# Patient Record
Sex: FEMALE | Race: BLACK OR AFRICAN AMERICAN | NOT HISPANIC OR LATINO | URBAN - METROPOLITAN AREA
[De-identification: names, ages, dates, MRNs, and addresses within clinical notes are randomized per-mention and may not be internally consistent; named-entity substitution may affect disease eponyms.]

---

## 2018-02-19 ENCOUNTER — TELEPHONE (OUTPATIENT)
Dept: TRANSPLANT | Facility: CLINIC | Age: 53
End: 2018-02-19

## 2018-02-19 NOTE — TELEPHONE ENCOUNTER
Angel Shin called and stated that she is interested in becoming a living donor for her aunt, Deepthi Jensen.  Medical and social history obtained.  No contraindications noted.  All questions answered.  HLA kit requested. Education book emailed to patient.

## 2018-02-27 ENCOUNTER — TELEPHONE (OUTPATIENT)
Dept: TRANSPLANT | Facility: CLINIC | Age: 53
End: 2018-02-27

## 2018-02-27 DIAGNOSIS — Z00.5 TRANSPLANT DONOR EVALUATION: Primary | ICD-10-CM

## 2018-03-08 ENCOUNTER — LAB VISIT (OUTPATIENT)
Dept: LAB | Facility: HOSPITAL | Age: 53
End: 2018-03-08
Attending: NURSE PRACTITIONER
Payer: MEDICARE

## 2018-03-08 DIAGNOSIS — Z00.5 TRANSPLANT DONOR EVALUATION: ICD-10-CM

## 2018-03-08 LAB — ABO + RH BLD: NORMAL

## 2018-03-08 PROCEDURE — 86901 BLOOD TYPING SEROLOGIC RH(D): CPT | Mod: TXP

## 2018-03-08 PROCEDURE — 81373 HLA I TYPING 1 LOCUS LR: CPT | Mod: PO,TXP

## 2018-03-08 PROCEDURE — 81373 HLA I TYPING 1 LOCUS LR: CPT | Mod: 91,PO,TXP

## 2018-03-08 PROCEDURE — 81376 HLA II TYPING 1 LOCUS LR: CPT | Mod: 91,PO,TXP

## 2018-03-08 PROCEDURE — 81376 HLA II TYPING 1 LOCUS LR: CPT | Mod: PO,TXP

## 2018-03-08 PROCEDURE — 36415 COLL VENOUS BLD VENIPUNCTURE: CPT | Mod: PO,TXP

## 2018-03-22 LAB
HLA DRB4 1: NORMAL
HLA SSO DNA TYPING CLASS I & II INTERPRETATION: NORMAL
HLA-A 1 SERO. EQUIV: 30
HLA-A 1: NORMAL
HLA-A 2 SERO. EQUIV: 33
HLA-A 2: NORMAL
HLA-B 1 SERO. EQUIV: 42
HLA-B 1: NORMAL
HLA-B 2 SERO. EQUIV: 45
HLA-B 2: NORMAL
HLA-BW 1 SERO. EQUIV: 6
HLA-BW 2 SERO. EQUIV: NORMAL
HLA-C 1: NORMAL
HLA-C 2: NORMAL
HLA-CW 1 SERO. EQUIV: 16
HLA-CW 2 SERO. EQUIV: 17
HLA-DQ 1 SERO. EQUIV: 4
HLA-DQ 2 SERO. EQUIV: 6
HLA-DQB1 1: NORMAL
HLA-DQB1 2: NORMAL
HLA-DRB1 1 SERO. EQUIV: 18
HLA-DRB1 1: NORMAL
HLA-DRB1 2 SERO. EQUIV: 15
HLA-DRB1 2: NORMAL
HLA-DRB3 1: NORMAL
HLA-DRB3 2: NORMAL
HLA-DRB345 1 SERO. EQUIV: 52
HLA-DRB345 2 SERO. EQUIV: 51
HLA-DRB4 2: NORMAL
HLA-DRB5 1: NORMAL
HLA-DRB5 2: NORMAL
HLATY INTERPRETATION: NORMAL
SSDQB TESTING DATE: NORMAL
SSDRB TESTING DATE: NORMAL
SSOA TESTING DATE: NORMAL
SSOB TESTING DATE: NORMAL
SSOC TESTING DATE: NORMAL
SSODR TESTING DATE: NORMAL
TYSSO TESTING DATE: NORMAL

## 2018-03-29 ENCOUNTER — TELEPHONE (OUTPATIENT)
Dept: TRANSPLANT | Facility: CLINIC | Age: 53
End: 2018-03-29

## 2018-04-04 ENCOUNTER — TELEPHONE (OUTPATIENT)
Dept: TRANSPLANT | Facility: CLINIC | Age: 53
End: 2018-04-04

## 2018-04-04 DIAGNOSIS — Z00.5 TRANSPLANT DONOR EVALUATION: Primary | ICD-10-CM

## 2018-04-04 NOTE — TELEPHONE ENCOUNTER
Called patient to inform her that she is compatible.  She would like to proceed with medical evaluation.  Necessary testing reviewed with patient.  Contact information for Julieth Dudley was provided for scheduling appointments.  Will fax over most recent Mammo and PAP.  All questions answered and patient verbalized understanding.

## 2018-04-10 DIAGNOSIS — Z00.5 TRANSPLANT DONOR EVALUATION: Primary | ICD-10-CM

## 2018-04-17 DIAGNOSIS — Z00.5 TRANSPLANT DONOR EVALUATION: Primary | ICD-10-CM

## 2018-05-07 ENCOUNTER — HOSPITAL ENCOUNTER (OUTPATIENT)
Dept: RADIOLOGY | Facility: HOSPITAL | Age: 53
Discharge: HOME | End: 2018-05-07
Attending: NURSE PRACTITIONER
Payer: MEDICARE

## 2018-05-07 ENCOUNTER — LAB VISIT (OUTPATIENT)
Dept: LAB | Facility: HOSPITAL | Age: 53
End: 2018-05-07
Payer: MEDICARE

## 2018-05-07 DIAGNOSIS — Z00.5 TRANSPLANT DONOR EVALUATION: ICD-10-CM

## 2018-05-07 LAB
ABO + RH BLD: NORMAL
ALBUMIN SERPL BCP-MCNC: 3.9 G/DL
ALP SERPL-CCNC: 130 U/L
ALT SERPL W/O P-5'-P-CCNC: 62 U/L
ANION GAP SERPL CALC-SCNC: 9 MMOL/L
APTT BLDCRRT: 21.5 SEC
AST SERPL-CCNC: 56 U/L
BASOPHILS # BLD AUTO: 0.04 K/UL
BASOPHILS NFR BLD: 0.8 %
BILIRUB SERPL-MCNC: 0.4 MG/DL
BLD GP AB SCN CELLS X3 SERPL QL: NORMAL
BUN SERPL-MCNC: 8 MG/DL
CALCIUM SERPL-MCNC: 9.2 MG/DL
CHLORIDE SERPL-SCNC: 107 MMOL/L
CHOLEST SERPL-MCNC: 215 MG/DL
CHOLEST/HDLC SERPL: 3.2 {RATIO}
CO2 SERPL-SCNC: 23 MMOL/L
CREAT SERPL-MCNC: 0.7 MG/DL
DIFFERENTIAL METHOD: ABNORMAL
EOSINOPHIL # BLD AUTO: 0.1 K/UL
EOSINOPHIL NFR BLD: 2.7 %
ERYTHROCYTE [DISTWIDTH] IN BLOOD BY AUTOMATED COUNT: 13.8 %
EST. GFR  (AFRICAN AMERICAN): >60 ML/MIN/1.73 M^2
EST. GFR  (NON AFRICAN AMERICAN): >60 ML/MIN/1.73 M^2
ESTIMATED AVG GLUCOSE: 108 MG/DL
GLUCOSE SERPL-MCNC: 107 MG/DL
GLUCOSE SERPL-MCNC: 138 MG/DL
GLUCOSE SERPL-MCNC: 86 MG/DL
GLUCOSE SERPL-MCNC: 89 MG/DL
HBA1C MFR BLD HPLC: 5.4 %
HCT VFR BLD AUTO: 29.4 %
HDLC SERPL-MCNC: 68 MG/DL
HDLC SERPL: 31.6 %
HGB BLD-MCNC: 9.2 G/DL
IMM GRANULOCYTES # BLD AUTO: 0.02 K/UL
IMM GRANULOCYTES NFR BLD AUTO: 0.4 %
INR PPP: 0.9
LDLC SERPL CALC-MCNC: 135.6 MG/DL
LYMPHOCYTES # BLD AUTO: 1.7 K/UL
LYMPHOCYTES NFR BLD: 32 %
MCH RBC QN AUTO: 23.8 PG
MCHC RBC AUTO-ENTMCNC: 31.3 G/DL
MCV RBC AUTO: 76 FL
MONOCYTES # BLD AUTO: 0.3 K/UL
MONOCYTES NFR BLD: 6.6 %
NEUTROPHILS # BLD AUTO: 3 K/UL
NEUTROPHILS NFR BLD: 57.5 %
NONHDLC SERPL-MCNC: 147 MG/DL
NRBC BLD-RTO: 0 /100 WBC
PHOSPHATE SERPL-MCNC: 3.1 MG/DL
PLATELET # BLD AUTO: 294 K/UL
PMV BLD AUTO: 10.6 FL
POTASSIUM SERPL-SCNC: 3.8 MMOL/L
PROT SERPL-MCNC: 7.6 G/DL
PROTHROMBIN TIME: 9.8 SEC
RBC # BLD AUTO: 3.86 M/UL
SODIUM SERPL-SCNC: 139 MMOL/L
TRIGL SERPL-MCNC: 57 MG/DL
WBC # BLD AUTO: 5.16 K/UL

## 2018-05-07 PROCEDURE — 86665 EPSTEIN-BARR CAPSID VCA: CPT | Mod: TXP

## 2018-05-07 PROCEDURE — 86592 SYPHILIS TEST NON-TREP QUAL: CPT | Mod: TXP

## 2018-05-07 PROCEDURE — 78707 K FLOW/FUNCT IMAGE W/O DRUG: CPT | Mod: 26,TXP,, | Performed by: RADIOLOGY

## 2018-05-07 PROCEDURE — 86704 HEP B CORE ANTIBODY TOTAL: CPT | Mod: TXP

## 2018-05-07 PROCEDURE — 83540 ASSAY OF IRON: CPT | Mod: TXP

## 2018-05-07 PROCEDURE — A9562 TC99M MERTIATIDE: HCPCS | Mod: TXP

## 2018-05-07 PROCEDURE — 85730 THROMBOPLASTIN TIME PARTIAL: CPT | Mod: TXP

## 2018-05-07 PROCEDURE — 86803 HEPATITIS C AB TEST: CPT | Mod: TXP

## 2018-05-07 PROCEDURE — 36415 COLL VENOUS BLD VENIPUNCTURE: CPT | Mod: TXP

## 2018-05-07 PROCEDURE — 85610 PROTHROMBIN TIME: CPT | Mod: TXP

## 2018-05-07 PROCEDURE — 86703 HIV-1/HIV-2 1 RESULT ANTBDY: CPT | Mod: TXP

## 2018-05-07 PROCEDURE — 86706 HEP B SURFACE ANTIBODY: CPT | Mod: TXP

## 2018-05-07 PROCEDURE — 86635 COCCIDIOIDES ANTIBODY: CPT | Mod: TXP

## 2018-05-07 PROCEDURE — 85025 COMPLETE CBC W/AUTO DIFF WBC: CPT | Mod: TXP

## 2018-05-07 PROCEDURE — 83036 HEMOGLOBIN GLYCOSYLATED A1C: CPT | Mod: TXP

## 2018-05-07 PROCEDURE — 80053 COMPREHEN METABOLIC PANEL: CPT | Mod: TXP

## 2018-05-07 PROCEDURE — 87340 HEPATITIS B SURFACE AG IA: CPT | Mod: TXP

## 2018-05-07 PROCEDURE — 82951 GLUCOSE TOLERANCE TEST (GTT): CPT | Mod: TXP

## 2018-05-07 PROCEDURE — 84100 ASSAY OF PHOSPHORUS: CPT | Mod: TXP

## 2018-05-07 PROCEDURE — 80061 LIPID PANEL: CPT | Mod: TXP

## 2018-05-07 PROCEDURE — 86644 CMV ANTIBODY: CPT | Mod: TXP

## 2018-05-07 PROCEDURE — 82728 ASSAY OF FERRITIN: CPT | Mod: TXP

## 2018-05-07 PROCEDURE — 71046 X-RAY EXAM CHEST 2 VIEWS: CPT | Mod: 26,TXP,, | Performed by: RADIOLOGY

## 2018-05-07 PROCEDURE — 71046 X-RAY EXAM CHEST 2 VIEWS: CPT | Mod: TC,FY,TXP

## 2018-05-07 PROCEDURE — 86480 TB TEST CELL IMMUN MEASURE: CPT | Mod: TXP

## 2018-05-07 PROCEDURE — 86901 BLOOD TYPING SEROLOGIC RH(D): CPT | Mod: TXP

## 2018-05-08 ENCOUNTER — OFFICE VISIT (OUTPATIENT)
Dept: TRANSPLANT | Facility: CLINIC | Age: 53
End: 2018-05-08
Payer: MEDICARE

## 2018-05-08 ENCOUNTER — HOSPITAL ENCOUNTER (OUTPATIENT)
Dept: RADIOLOGY | Facility: HOSPITAL | Age: 53
Discharge: HOME | End: 2018-05-08
Attending: NURSE PRACTITIONER
Payer: MEDICARE

## 2018-05-08 VITALS
HEART RATE: 75 BPM | DIASTOLIC BLOOD PRESSURE: 77 MMHG | TEMPERATURE: 98 F | RESPIRATION RATE: 16 BRPM | WEIGHT: 156.94 LBS | HEIGHT: 66 IN | OXYGEN SATURATION: 100 % | BODY MASS INDEX: 25.22 KG/M2 | SYSTOLIC BLOOD PRESSURE: 159 MMHG

## 2018-05-08 DIAGNOSIS — Z00.5 TRANSPLANT DONOR EVALUATION: ICD-10-CM

## 2018-05-08 DIAGNOSIS — Z86.19 HISTORY OF HEPATITIS A: ICD-10-CM

## 2018-05-08 DIAGNOSIS — Z00.5 WILLING TO BE KIDNEY DONOR: Primary | ICD-10-CM

## 2018-05-08 DIAGNOSIS — D64.9 ANEMIA, UNSPECIFIED TYPE: ICD-10-CM

## 2018-05-08 DIAGNOSIS — E78.5 HYPERLIPIDEMIA, UNSPECIFIED HYPERLIPIDEMIA TYPE: ICD-10-CM

## 2018-05-08 DIAGNOSIS — D25.9 UTERINE LEIOMYOMA, UNSPECIFIED LOCATION: ICD-10-CM

## 2018-05-08 DIAGNOSIS — D57.3 SICKLE CELL TRAIT: ICD-10-CM

## 2018-05-08 LAB
EBV VCA IGG SER QL IA: POSITIVE
FERRITIN SERPL-MCNC: 21 NG/ML
IRON SERPL-MCNC: 27 UG/DL
SATURATED IRON: 5 %
TOTAL IRON BINDING CAPACITY: 591 UG/DL
TRANSFERRIN SERPL-MCNC: 399 MG/DL

## 2018-05-08 PROCEDURE — 99205 OFFICE O/P NEW HI 60 MIN: CPT | Mod: S$PBB,TXP,, | Performed by: INTERNAL MEDICINE

## 2018-05-08 PROCEDURE — 74174 CTA ABD&PLVS W/CONTRAST: CPT | Mod: TC,TXP

## 2018-05-08 PROCEDURE — 97802 MEDICAL NUTRITION INDIV IN: CPT | Mod: PBBFAC,TXP | Performed by: DIETITIAN, REGISTERED

## 2018-05-08 PROCEDURE — 99214 OFFICE O/P EST MOD 30 MIN: CPT | Mod: PBBFAC,25,TXP | Performed by: INTERNAL MEDICINE

## 2018-05-08 PROCEDURE — 25500020 PHARM REV CODE 255: Mod: TXP | Performed by: NURSE PRACTITIONER

## 2018-05-08 PROCEDURE — 99999 PR PBB SHADOW E&M-EST. PATIENT-LVL IV: CPT | Mod: PBBFAC,TXP,, | Performed by: INTERNAL MEDICINE

## 2018-05-08 PROCEDURE — 74174 CTA ABD&PLVS W/CONTRAST: CPT | Mod: 26,TXP,, | Performed by: RADIOLOGY

## 2018-05-08 PROCEDURE — 99204 OFFICE O/P NEW MOD 45 MIN: CPT | Mod: S$PBB,TXP,, | Performed by: TRANSPLANT SURGERY

## 2018-05-08 RX ORDER — ACETAMINOPHEN, DEXTROMETHORPHAN HBR, DOXYLAMINE SUCCINATE, PHENYLEPHRINE HCL 650; 20; 12.5; 1 MG/30ML; MG/30ML; MG/30ML; MG/30ML
1 SOLUTION ORAL DAILY
COMMUNITY

## 2018-05-08 RX ORDER — CETIRIZINE HYDROCHLORIDE 10 MG/1
10 TABLET ORAL DAILY PRN
COMMUNITY
End: 2019-08-01

## 2018-05-08 RX ADMIN — IOHEXOL 100 ML: 350 INJECTION, SOLUTION INTRAVENOUS at 03:05

## 2018-05-08 NOTE — PROGRESS NOTES
TRANSPLANT DONOR PSYCHOSOCIAL ASSESSMENT    Angel Melissa  [address]  [address]  [place] [address]    Telephone Information:   Mobile [phone]     Home [phone] (home)  Work  [phone] (work)  E-mail  [email or web address]    PHS Increased Risk Behavioral Questionnaire reviewed by : Yes   addressed any PHS increased risk behaviors or concerns. Resources and education provided as appropriate.    Sex: female  YOB: 1965  Age: 53 y.o.    Encounter Date: 5/8/2018  [place] Citizen: yes  Primary Language: English   Needed: no    Potential Recipient: Deepthi Jensen  Clinic Number: [id number]  Donor's Relationship to Patient: aunt    Emergency Contact:  Name: Tata Melissa  Relationship: mother  Address: [place], [place]  Phone Numbers:  [phone] (mobile)    Family/Social Support:   Number of dependents/: 0  Marital history: pt has been  once, ending in divorce.  Other family dynamics: Pt reports that she is alone, but her family is very big and she has multiple aunts, uncles and nephews/neices.     Household Composition:  Patient lives alone.  Do you and your caregivers have access to reliable transportation? yes  PRIMARY CAREGIVER: Tata Melissa will be primary caregiver, phone number [phone].      provided in-depth information to patient and caregiver regarding pre- and post-transplant caregiver role.   strongly encourages patient and caregiver to have concrete plan regarding post-transplant care giving, including back-up caregiver(s) to ensure care giving needs are met as needed.    Patient and Caregiver states understanding all aspects of caregiver role/commitment and is able/willing/committed to being caregiver to the fullest extent necessary.    Patient and Caregiver verbalizes understanding of the education provided today and caregiver responsibilities.         remains available. Patient and Caregiver  agree to contact  in a timely manner if concerns arise.      Able to take time off work without financial concerns: yes.     Additional Significant Others who will Assist with Transplant:  Patient report that after transplant she plans to go to her mother's house in [place]. Pt reports that her whole family lives in the same subdivision and(about 6 of them all of house beside each other) and will be surrounded by support.    Living Will: no  Healthcare Power of : no SW provided pt with advanced directives to fill out.   Advance Directives on file: <no information> per medical record.  Verbally reviewed LW/HCPA information.   provided patient with copy of LW/HCPA documents and provided education on completion of forms.    Education: 4 years of college  Reading Ability: college  Reports difficulty with: N/A  Learns [employer]:  multisensory     Status: yes: [employer], date: pt reports she was in the [employer] for 24 years  [facility] Benefits: yes     Employment:  Pt reports she works for the [employer] in the [employer] department  Fundraising and [employer]LD[employer] information provided to patient.  Patient verbalizes understanding.   remains available.    Spouse/Significant Other Employment: n/a    Insurance: see potential recipient's insurance for donor coverage.    Does the donor have health insurance? Yes    Patient verbalizes clear understanding that patient may experience difficulty obtaining and/or be denied insurance coverages post-surgery.  This includes and is not limited to disability insurance, life insurance, health insurance, burial insurance, long term care insurance, and other insurances.  Patient also reports understanding that future health concerns related to or unrelated to transplantation may not be covered by patient's insurance.  Resources and information provided and reviewed.      Patient provides verbal permission to release any necessary information to outside resources for patient  care and discharge planning.  Resources and information provided and reviewed.      Infusion Service: patient utilizing? no  Home Health: patient utilizing? no  DME: no  ADLS:  Pt reports she can perform all her ADLs with ease.    Adherence:   Pt reports complete adherence to all medical advice.   Adherence education and counseling provided    Per History Section:  Past Medical History:   Diagnosis Date    Anemia     History of hepatitis A in 1999     Hyperlipidemia     Sickle cell trait     Uterine fibroid     Willing to be kidney donor      Social History   Substance Use Topics    Smoking status: Never Smoker    Smokeless tobacco: Never Used    Alcohol use No     History   Drug Use No     History   Sexual Activity    Sexual activity: Not on file       Per Today's Psychosocial:  Tobacco: none, patient denies any use.  Alcohol: none, patient denies any use.  Illicit Drugs/Non-prescribed Medications: none, patient denies any use.    Patient states clear understanding of the potential impact of substance use as it relates to donor candidacy and is agreeable to random substance screening.  Substance abstinence/cessation counseling, education and resources provided and reviewed.     Arrests/DWI/Treatment/Rehab: patient denies    Psychiatric History:    Mental Health: Pt reports in 2000 she experienced signs of PTSD. [place]  and was treated. Pt reports she has a dog at home that helps with her anxiety and keeps her busy. Pt reports she has not had any symptoms of PTSD in a very long time and feels stable.  Psychiatrist/Counselor: pt reports she did see a psychiatrist when she came back home.  Medications:  Pt denies  Suicide/Homicide Issues: pt denies any previous or current through of SI/HI.    Safety at home: pt confirms feelings safe at home and denies any history of trauma of any kind.      Donation Knowledge/Expectations: Patient states  having clear understanding and realistic expectations regarding the potential risks and potential benefits of organ transplantation and organ donation and agrees to discuss with health care team members and support system members, as well as to utilize available resources and express questions and/or concerns in order to further facilitate the patients informed decision-making.  Resources and information provided and reviewed. .    Decision-making Process: pt report no one else in her family is able to donate to her aunt. Pt reports she feels veyr strongly about donating and has no reservation about possible graft failure after transplant. Pt reports that some people in her family do not completely agree with this decision but it has not effected the patient in any way.  Patient states understanding that transplant and donation are not a guarantee that the donated organ will function.  Patient states understanding of kidney treatment options available for kidney patients, psychosocial aspects surrounding organ donation and transplantation as well as recovery.  Patient also states clear understanding that patient may choose to not donate at any time prior to surgery taking place, and that patient confidentiality is protected.  Patient reports expected compliance with health care regime and states understanding of importance of compliance.  Educational information provided.    Patient reports having a clear understanding  that risks and benefits may be involved with organ transplantation and with organ donation and  of the treatment options available to a potential transplant recipient. Patient reports clear understanding that psychosocial risk factors which may affect patient, including but not limited to feelings of depression, generalized anxiety, anxiety regarding dependence on others, post traumatic stress disorder, feelings of guilt and other emotional and/or mental concerns, and/or exacerbation of existing  mental health concerns.     Detailed resources and education provided and discussed. Patient agrees to access appropriate resources in a timely manner as needed and to communicate concerns appropriately.      Feelings or Concerns:  Patient denies feelings of coercion, pressure or obligation to donate. Patient states that patient is not receiving any compensation for organ donation. Patient reports motivation to pursue organ donation at this time.     Patient reports having clear and realistic expectations and understanding of the many psychosocial aspects involved with being a living organ donor, including but not limited to costs, compliance, medications, lab work, procedures, appointments, financial planning, preparedness, timely and appropriate communication of concerns, abstinence from non-prescribed drugs or substances, importance of adherence to and follow-through with all health care team recommendations, participation in health care and treatment planning, utilization of resources and follow-through, mental health counseling as needed and/or recommended, and the patient and caregiver responsibilities.  Patient states having a clear understanding of possible difficulty obtaining or possibility of being denied insurance coverage post-surgery.  This includes and is not limited to disability insurance, life insurance, health insurance, burial insurance, long-term care insurance and other insurances.  Educational and resource information provided and reviewed.  Patient also reports understanding that future health concerns related to or unrelated to organ donation may not be covered by patients insurance.    Coping:  Pt reports she is coping well with this decision. She reports she spends time with her family and loves to take her dog for walks.     Interview Behavior: Angel presents as alert and oriented x 4, pleasant, good eye contact, well groomed, recall good, concentration/judgement good, average  intelligence, calm, communicative, cooperative and asking and answering questions appropriately.      Suitability for Donation: Angel Melissa presents as a suitable candidate for donation at this time.    Recommendations/Additional Comments: SW recommends that pt conduct fundraising to assist pt with pay for cost of medication, food,gas, and other transplant related expenses. SW recommends that pt remain free of all tobacco,ETOH, and substance use. SW remains available to assist with any concerns that may arise as pt navigates through the transplant process.

## 2018-05-08 NOTE — PROGRESS NOTES
REFERRING PROVIDER: Scarlet Valera MD    REASON FOR VIST: pre-kidney donor, low cholesterol diet education    PAST MEDICAL HX:   Past Medical History:   Diagnosis Date    Anemia     History of hepatitis A in 1999     Hyperlipidemia     Uterine fibroid     Willing to be kidney donor        LABS: 5/7/18 total cholesterol 215    NUTRITION HX/INTERVENTION: Pt reports a hx of elevated cholesterol level.  Approx a year and a half ago, pt made changes to diet to improve cholesterol level including decreasing intake of meat (only consumes approx twice per week), reducing intake of eggs, does not cook with oils, uses ground flaxseed in smoothies twice per week, consumes more fruits/vegetables/and whole grains, and minimizes intake of fried foods.  Walks approx 1 mile per day.  Pt has had intentional wt loss of approx 30lbs during this time.  -Encouraged pt to continue with healthy eating habits and physical activity.  -Low Cholesterol packet reviewed (good & bad fats, low chol nutrition guidelines,  Foods recommended/foods to avoid, sample menu).     Patient voiced understanding of education & goals. Contact information was provided & will f/u as needed at clinic visits.     Consultation Time: 15 minutes

## 2018-05-08 NOTE — PROGRESS NOTES
Kidney Transplant Donor Evaluation    Subjective:       CC:  Initial evaluation of kidney donor candidacy.    HPI:  Ms. Melissa is a 53 y.o. year old Black or  female who has presented to be evaluated as a potential living related donor for her maternal aunt.  Ms. Melissa reports being here without coercion, payment, guilt or other alternative motives other than wanting to help someone with kidney disease.    States she had recurrent UTIs in her 20s - estimates ~5-6 UTIs in her lifetime. States within 1 year she had 2-3 and then had another 1-2 later on. Last UTI was in the late 1990s.     Multiple uterine fibroids removed in 2011. States she has had increase in size of remaining uterine fibroids and her current Gyn provider is recommending a hysterectomy. Last seen Gyn in Jan/Feb 2018.     She has anemia since her teenage years - states it was told to her secondary to her bleeding fibroids. In her youth she always had heavy periods. She states she has sickle cell trait. States the sickle cell trait is from the paternal side.     She was in [place] (while in the ) and was stated that she had hepatitis A in 1999.     Patient denies any history of coronary artery disease, stroke, seizure disorder, chronic obstructive pulmonary disease, liver disease, kidney stones, gallstones, deep venous thrombosis, pulmonary embolism, or malignancies.    Past Medical History:   Past Medical History:   Diagnosis Date    Anemia     History of hepatitis A in 1999     Hyperlipidemia     Sickle cell trait     Uterine fibroid     Willing to be kidney donor        Past Surgical History:   Past Surgical History:   Procedure Laterality Date    CERVICAL BIOPSY  W/ LOOP ELECTRODE EXCISION  1999    GANGLION CYST EXCISION Right ~2007    right wrist    UTERINE FIBROID SURGERY  2011    WISDOM TOOTH EXTRACTION  1993       Medications:   Current Outpatient Prescriptions   Medication Sig Dispense Refill    cetirizine  (ZYRTEC) 10 MG tablet Take 10 mg by mouth daily as needed for Allergies.      cyanocobalamin, vitamin B-12, (VITAMIN B-12) 1,000 mcg TbSR Take 1 tablet by mouth once daily.      naproxen sodium (ALEVE ORAL) Take 220 mg by mouth 2 (two) times daily as needed.       No current facility-administered medications for this visit.        *states will take Aleve for menstrual cramps/headache - states she will use 10 tabs a month usually during her menstrual cycle     Allergies:   Review of patient's allergies indicates:  No Known Allergies    Social History:  Social History     Social History    Marital status: Single     Spouse name: N/A    Number of children: N/A    Years of education: N/A     Occupational History    Not on file.     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) - she was in [place], [place], [place], [place], [place]       Family History:   Family History   Problem Relation Age of Onset    Hypertension Mother     Heart attack Father     Hypertension Sister     Diabetes Sister     Kidney disease Maternal Grandmother     Hypertension Maternal Grandmother     Diabetes Maternal Grandmother     Heart attack Maternal Grandfather         massive MI at age 77    Heart failure Maternal Grandfather     Kidney disease Paternal Grandmother     Diabetes Paternal Grandmother     Hypertension Paternal Grandmother     Throat cancer Paternal Grandmother     Cancer Paternal Grandmother         female reproductive organ cancer    Clotting disorder Sister         DVT on warfarin    Sickle cell trait Sister     Kidney disease Maternal Aunt     Hypertension Maternal Aunt     Diabetes Maternal Aunt     Stroke Paternal Uncle     Heart attack Paternal  Uncle     Heart attack Paternal Uncle        Review of Systems  Constitutional: +fatigue - always on the go but does have anemia; +always cold; +lost 30 lbs with dietary changes since Jan 2017 ; no fevers, chills, loss of appetite, night sweats  Eyes: +wears reading glasses; No dryness, redness, blurry vision, loss of vision  Ear, Nose, Throat: No hearing problems, runny nose, mouth dryness, problems swallowing, dental problems  Respiratory: +dry cough with allergies; No cough, shortness of breath, sputum, bloody sputum  Cardiovascular: No chest pain or palpitations  Gastrointestinal: +fibroid related abdominal pain - does have pain daily but rates it as mild and states it is manageable; no nausea, vomiting, diarrhea, constipation, blood in stool  Genitourinary: +always had irregular cycles - LMP was in April 2018 and had bleeding for 13-14 days; +urinary frequency, urgency, and occasional incontinence - sensation of incomplete emptying; +stress incontinence; always wears a pad essentially  Skin: no rash or itching  Musculoskeletal: +bulging discs in neck - injury in  - will use TENS and massages; no muscle pain  Neurologic: no motor weakness, numbness, tingling, or seizures  Psychiatric: +depression and anxiety when she first went to [place] in 1999 - states she was treated with counseling since pills were not effective; +PTSD; states her mood is stable; no mood swings, los  Hematologic: +anemia; +easy bleeding or bruising, No blood clots  Endocrine: no thyroid problems or diabetes  Immunologic: +hay fever for 4-5 months a year; no chronic infection, eczema    Functional History  Angel Melissa is able to climb a flight of stairs, walk a mile, jog, and play recreational sports without any limitation or difficulty. States she walks up and down 5 flights of stairs at work.     Health Care Maintenance  [facility] with P[facility]    For Women:  Last Physical Exam: Oct/Nov 2017  Last  "Colonoscopy: N/A  Last Pap smear: ~Nov/Dec 2017  Last Mammogram: March 2017  Last Menstrual period: April 2018  G 0 P 0    Are you pregnant or plan on becoming pregnant within the next year? no  How many previous pregnancies have you had? none  Have you ever had a miscarriage? N/A  Have you had any complications during the pregnancy? N/A  Any history of diabetes, hypertension, preeclampsia, or protein in the urine while pregnant? N/A    Objective:   Physical Exam   /86 (BP Location: Left arm, Patient Position: Sitting, BP Method: Medium (Automatic))   Pulse 75   Temp 97.9 °F (36.6 °C) (Oral)   Resp 16   Ht 5' 5.98" (1.676 m)   Wt 71.2 kg (156 lb 15.5 oz)   SpO2 100%   BMI 25.35 kg/m²     General: No acute distress, well groomed, alert and oriented x 3  HEENT: Normocephalic, atraumatic, PERRLA, sclera anicteric, conjunctiva/corneas clear, EOM's intact bilaterally, external inspection of ears and nose normal, moist mucous membranes, no oral ulcerations/lesions   Neck: Supple, symmetrical, trachea midline, no masses, no carotid bruits, no JVD, thyroid is normal without nodules or enlargement   Respiratory: Clear to auscultation bilaterally, respirations unlabored, no rales/rhonchi/wheezing   Cardiovacular: Regular rate and rhythm, S1, S2 normal, no murmurs, no rubs or gallops   Gastrointestinal: Soft, non-tender, bowel sounds normal, no masses, no palpable organomegaly  Extremities: No clubbing or cyanosis of upper extremities bilaterally, no pedal edema bilaterally; +2 bilateral radial pulses  Skin: warm and dry; no rash on exposed skin  Lymph nodes: Cervical and supraclavicular nodes normal   Neurologic: No focal neurologic deficits.   Musculoskeletal: moves all extremities without difficulty, FROM, 5/5 strength, ambulates without an assistive device  Psychiatric: Normal mood and affect. Responds appropriately to questions.      Labs:  5/7/2018: Creatinine 0.7 mg/dL (Ref range: 0.5 - 1.4 mg/dL); " Creatinine, Random Ur 86.0 mg/dL (Ref range: 15.0 - 325.0 mg/dL); Urine, Creatinine 55.0 mg/dL (Ref range: 15.0 - 325.0 mg/dL); BUN, Bld 8 mg/dL (Ref range: 6 - 20 mg/dL)  5/7/2018: Nitrite, UA Negative (Ref range: Negative)  ABO type: B POS    Assessment:     1. Willing to be kidney donor    2. Hyperlipidemia, unspecified hyperlipidemia type    3. Uterine leiomyoma, unspecified location    4. Anemia, unspecified type    5. History of hepatitis A in 1999    6. Sickle cell trait        Plan:   Donor Candidacy:   Based on the given information, Ms. Melissa appears to be a high-risk candidate for kidney donation given sickle cell trait which is associated with CKD (SUSIE. 2014 Nov 26;312(20):2115-25. doi: 10.1001/susie.2014.15500. states a 57% increased risk of CKD in carriers of the sickle cell trait compared to non-carriers and a 86% increased risk of albuminuria), split function showing 61% for right kidney and 39% for left kidney; stress incontinence; urinary frequency, urgency, and occasional incontinence - sensation of incomplete emptying (she wears a pad all the time).  A final recommendation will be made by the selection committee after reviewing her complete workup.    Would see what her CT A/P shows and bring patient up for group discussion prior to doing further donor work-up.     Advised her that she should schedule a colonoscopy for her own healthcare maintenance (would definitely be needed if she was to continue further donor work-up).     Encouraged her to continue following up with her PCP.       Scarlet Valera MD       Counseling:   I discussed with Ms. Melissa that donation is voluntary and reiterated it should be done willingly and for altruistic reasons only.  I reviewed that no payment should be received for donating.  I also discussed that coercion, guilt, pressure, or feelings of obligation are not appropriate reasons to donate.  The option to withdraw at any time was emphasized.  Ms. Melissa was reminded  that a medical opt out can be given to protect her confidentiality, and no member of the transplant team will discuss specifics of her health or medical/social history with anyone else without permission.  The need for lifelong routine medical follow-up for optimal health, including routine health maintenance was reviewed.    We also discussed the long term risks associated with kidney donation.  I told the patient that her GFR should return to within 75-80% of pre-donation level within six months of donation.  I informed the patient that there is a small risk of developing albuminuria and hypertension following donor nephrectomy.  I also informed the patient that based on current literature, the risk of developing end-stage renal disease following donor nephrectomy is similar to the general population.    I reviewed with Ms. Melissa available lab results and other diagnostics from the evaluation process    Additional Counseling:   The patient was counseled on the need for regular follow-up with a primary care physician for blood pressure and cholesterol screening.  The importance of age appropriate health screening was also emphasized.    Follow-up:   As per protocol    Altogether, 45 minutes of this encounter were spent on counseling, which was greater than 50% of the total visit time (60 minutes).

## 2018-05-08 NOTE — PATIENT INSTRUCTIONS
1. Avoid excess use of ibuprofen, naproxen, Motrin (NSAIDs), protein consumption, iodinated contrast dye  2. Stay active   3. Follow-up with your PCP  4. Get a colonoscopy done  5. We will look at the CT scan and let you know if we can proceed with further donor work-up

## 2018-05-08 NOTE — LETTER
May 9, 2018                     [address]  [address]  [facility] [address]  Phone: [phone]   Patient: Angel Melissa   MR Number: [id number]   YOB: 1965   Date of Visit: 5/8/2018       Dear      Thank you for referring Angel Melissa to me for evaluation. Attached you will find relevant portions of my assessment and plan of care.    If you have questions, please do not hesitate to call me. I look forward to following Angel Melissa along with you.    Sincerely,    Scarlet Valera MD    Enclosure    If you would like to receive this communication electronically, please contact [email or web address] or [phone] to request [employer] Link access.    [employer] Link is a tool which provides read-only access to select patient information with whom you have a relationship. Its easy to use and provides real time access to review your patients record including encounter summaries, notes, results, and demographic information.    If you feel you have received this communication in error or would no longer like to receive these types of communications, please e-mail [email or web address]

## 2018-05-08 NOTE — PROGRESS NOTES
Transplant Surgery Kidney Donor Evaluation     Referring Physician:     Subjective:     Chief Complaint: Angel Melissa is a 53 y.o. year old female who presents today wishing to be evaluated as a potential living related donor for her aunt.    History of Present Illness:  Angel reports being here without coercion, payment, guilt, or other alternative motives other than wanting to help someone with kidney disease.    Review of Systems   Constitutional: Negative for chills and fever.   HENT: Negative for facial swelling and sore throat.    Eyes: Negative for redness and itching.   Respiratory: Negative for cough and shortness of breath.    Cardiovascular: Negative for chest pain and leg swelling.   Gastrointestinal: Negative for abdominal distention and abdominal pain.   Endocrine: Negative for polydipsia and polyphagia.   Genitourinary: Negative for dysuria and hematuria.   Musculoskeletal: Negative for back pain and myalgias.   Skin: Negative for pallor and rash.   Allergic/Immunologic: Negative for environmental allergies and immunocompromised state.   Neurological: Negative for light-headedness and headaches.   Hematological: Negative for adenopathy. Does not bruise/bleed easily.   Psychiatric/Behavioral: Negative for agitation and confusion.       Objective:   Physical Exam   Constitutional: She is oriented to person, place, and time. She appears well-developed and well-nourished. No distress.   Neck: Normal range of motion. Neck supple. No JVD present.   Cardiovascular: Normal rate and intact distal pulses.    Pulmonary/Chest: Effort normal. No respiratory distress.   Abdominal: Soft. She exhibits no distension and no mass. There is no tenderness. There is no rebound and no guarding.       Musculoskeletal: She exhibits no edema.   Neurological: She is alert and oriented to person, place, and time.   Skin: Skin is warm and dry. She is not diaphoretic.   Psychiatric: She has a normal mood and affect.     ABO  type: B POS    Diagnoses:  No diagnosis found.         Transplant Surgery - Candidacy   Assessment/Plan:     Donor Candidacy: Based on information available thus far, Angel is a suitable candidate for living kidney donation. She has a history of uterine fibroids with a prior partial hysterectomy / fibroidectomy. She continues to have symptoms with recurrent fibroids and has been advised to have hysterectomy. We can discuss simultaneous hysterectomy with donor nephrectomy.     Aaron Garrett MD       Education: I discussed with the patient the requirements for donation including the compatibility of blood and tissue typing, healthy by physical examination and workup, as well as the desire to donate.  We discussed the risks related to surgery including the risks related to anesthesia, bleeding, infection, inability for surgery to be performed laparoscopically, risks of reoperation as well as the risks of death.  We discussed the length of hospitalization, return to work times, as well as follow-up post-donation.    I also discussed the slight possibility that due to problems with the recipient operation, the transplant might not be able to be completed after the organ was already removed. If such a situation should arise, the donor prefers that the organ be transplanted into a suitable third-party recipient.    I discussed the possibility that living donor sometimes encounter problems obtaining health insurance, or could have higher premium despite ongoing efforts of transplant professionals to educate insurance companies on this issue.    I discussed with Angel that donation is a voluntary activity, and reiterated it should be done willingly and for altruistic reasons only.  I reviewed that no payment should be made for donating.  I also discussed that coersion, guilt, pressure, or feelings of obligation are not appropriate reasons to donate.  The option to withdraw at any time was emphasized.  Angel was reminded  that a medical opt out can be given to protect her confidentiality, and no member of the transplant team will discuss specifics of her health or medical/social history with anyone else without permission.  The need for lifelong routine medical follow-up for optimal health, including routine health maintenance was reviewed.    Additionally, I discussed the need for our program to be able to contact living donors for UNOS reporting purposes for a minimum of 2 years.  Failure of our center to be able to provide such information could jeopardize our ability to continue to offer living donor transplants.  Angel voices understanding and agrees to this follow-up.    I discussed the UNOS requirement for centers to report donor status for a minimum of two years. Angel understands that failure to comply with requirement could have adverse consequences for our transplant program, and agrees to cooperate with all our required follow-up.    I reviewed with Angel available lab results and other diagnostics from the evaluation process.

## 2018-05-08 NOTE — PROGRESS NOTES
"DONOR TEACHING NOTE    Met with Angel Melissa today in clinic to review living donor education.        Topics covered included:  · Kidney donation is done voluntarily and of the donor's free will.  Process can stop at any time.   · Better success rates than cadaveric donation, shorter waiting time for the recipient: less than the 3-5 year wait on the list, more time to prepare: tests/surgery can be planned  · Laboratory studies of blood and urine.  Health exams: records of GYN/pap/mammogram (females); evaluation by transplant team and a psychiatrist (if indicated); diagnostic Tests: CXR, EKG, TB skin test, 24-hour urine collection, renal scan, CT of abdomen to assess kidney anatomy, and stress test (if indicated)  · Team will review workup for approval.  Copy of the approved criteria for donation given to patient.  Surgeon will decide which kidney will be donated.   · Benefits of laparoscopic nephrectomy: less pain, shorter hospital stay, a shorter recovery period.  Risks discussed: bleeding, deep vein thrombosis, pulmonary embolus, the need for re-operation.  Your operation may be converted to an "open" procedure if the surgeon feels it is medically necessary.  · To recovery room, transfer to TSU, if space allows or semi-private room.  [place]malley catheter/IV, average hospital stay is 1 day.  At discharge the cost of any prescriptions is the donor's responsibility.  · Post-operative visit 4 weeks after surgery or prior to returning to work, unless a complication arises and you need to be seen sooner.  Off of work for about 3 to 6 weeks, no driving for at least the first 3 weeks.  General surgical complications: infection, allergic reaction, and anesthesia.   · Long life considerations of living with one kidney: risk of HTN and kidney failure   · Following up with PCP 6 months after donation then yearly thereafter to monitor kidney function.  This should include weight, labs, urinalysis, and a blood pressure check.  We " are required to report your progress to UNOS at 6 months, 1 year, and 2 years post-donation.     Written educational material provided. Informed consent reviewed and signed. All questions were answered and patient verbalized understanding.     Patient is a suitable candidate for living kidney donation.

## 2018-05-08 NOTE — PROGRESS NOTES
PHARM.D. PRE-TRANSPLANT DONOR NOTE:    This patient's medication therapy was evaluated as part of her pre-transplant donor evaluation.    The following pharmacologic concerns were noted: takes NSAIDS prn      Current Outpatient Prescriptions   Medication Sig Dispense Refill    cetirizine (ZYRTEC) 10 MG tablet Take 10 mg by mouth daily as needed for Allergies.      cyanocobalamin, vitamin B-12, (VITAMIN B-12) 1,000 mcg TbSR Take 1 tablet by mouth once daily.      naproxen sodium (ALEVE ORAL) Take 220 mg by mouth 2 (two) times daily as needed.       No current facility-administered medications for this visit.          Currently Ms Melissa is responsible for preparing / administering this patient's medications on a daily basis.  I am available for consultation and can be contacted, as needed by the other members of the Kidney Transplant team.

## 2018-05-09 LAB
CMV AB TOTAL, DONOR EVAL (BLOOD CENTER): REACTIVE
HEPATITIS B CORE  AB, DONOR EVAL, (BLOOD CENTER): NORMAL
HEPATITIS B SURFACE AG, DONOR EVAL, (BLOOD CENTER): NORMAL
HEPATITIS C ANTIBODY,DONOR EVAL (BLOOD CENTER): NORMAL
HIV1/2 AG/AB, DONOR EVAL (BLOOD CENTER): NORMAL
RPR, DONOR EVAL (BLOOD CENTER): NORMAL
STRONGYLOIDES ANTIBODY IGG: NEGATIVE

## 2018-05-10 LAB
MITOGEN NIL: 9.84 IU/ML
NIL: 0.05 IU/ML
TB ANTIGEN NIL: -0.01 IU/ML
TB ANTIGEN: 0.04 IU/ML
TB GOLD: NEGATIVE

## 2018-05-11 ENCOUNTER — COMMITTEE REVIEW (OUTPATIENT)
Dept: TRANSPLANT | Facility: CLINIC | Age: 53
End: 2018-05-11

## 2018-05-11 LAB
ASPERGILLUS AB SER QL ID: NORMAL
B DERMAT AB SER QL ID: NORMAL
C IMMITIS AB SER QL ID: NORMAL
H CAPSUL AB TITR SER ID: NORMAL {TITER}
HEPATITIS B SURFACE AB, DONOR EVAL, (BLOOD CENTER): REACTIVE

## 2018-05-11 NOTE — COMMITTEE REVIEW
Angel Melissa was discussed at selection committee on 5/11/18.  Patient did not meet selection criteria for living kidney donation due to differential function and anatomy/size difference noted on CT scan.     Patient notified of committee decision. Informed of hydroureteronephrosis noted on CT due to enlarged uterus. All records faxed to patient for follow up with local physician. All questions were answered and patient verbalized understanding.     Note written by Gaye Christianson RN    ===============================================================  I was present at the meeting and attest to the decision of the committee.